# Patient Record
Sex: FEMALE | Race: WHITE | NOT HISPANIC OR LATINO
[De-identification: names, ages, dates, MRNs, and addresses within clinical notes are randomized per-mention and may not be internally consistent; named-entity substitution may affect disease eponyms.]

---

## 2018-03-19 PROBLEM — Z00.00 ENCOUNTER FOR PREVENTIVE HEALTH EXAMINATION: Status: ACTIVE | Noted: 2018-03-19

## 2018-04-16 ENCOUNTER — APPOINTMENT (OUTPATIENT)
Dept: NEUROLOGY | Facility: CLINIC | Age: 78
End: 2018-04-16
Payer: MEDICARE

## 2018-04-16 VITALS
HEIGHT: 61 IN | WEIGHT: 102 LBS | BODY MASS INDEX: 19.26 KG/M2 | DIASTOLIC BLOOD PRESSURE: 82 MMHG | SYSTOLIC BLOOD PRESSURE: 140 MMHG

## 2018-04-16 DIAGNOSIS — Z86.39 PERSONAL HISTORY OF OTHER ENDOCRINE, NUTRITIONAL AND METABOLIC DISEASE: ICD-10-CM

## 2018-04-16 DIAGNOSIS — Z78.9 OTHER SPECIFIED HEALTH STATUS: ICD-10-CM

## 2018-04-16 PROCEDURE — 99213 OFFICE O/P EST LOW 20 MIN: CPT

## 2018-04-16 RX ORDER — ATORVASTATIN CALCIUM 80 MG/1
TABLET, FILM COATED ORAL
Refills: 0 | Status: ACTIVE | COMMUNITY

## 2018-10-16 ENCOUNTER — APPOINTMENT (OUTPATIENT)
Dept: NEUROLOGY | Facility: CLINIC | Age: 78
End: 2018-10-16
Payer: MEDICARE

## 2018-10-16 VITALS
HEIGHT: 61 IN | WEIGHT: 102 LBS | BODY MASS INDEX: 19.26 KG/M2 | SYSTOLIC BLOOD PRESSURE: 116 MMHG | DIASTOLIC BLOOD PRESSURE: 82 MMHG

## 2018-10-16 PROCEDURE — 99213 OFFICE O/P EST LOW 20 MIN: CPT

## 2019-10-15 ENCOUNTER — APPOINTMENT (OUTPATIENT)
Dept: NEUROLOGY | Facility: CLINIC | Age: 79
End: 2019-10-15
Payer: MEDICARE

## 2019-10-15 VITALS
WEIGHT: 102 LBS | BODY MASS INDEX: 19.26 KG/M2 | DIASTOLIC BLOOD PRESSURE: 78 MMHG | HEIGHT: 61 IN | SYSTOLIC BLOOD PRESSURE: 150 MMHG

## 2019-10-15 PROCEDURE — 99213 OFFICE O/P EST LOW 20 MIN: CPT

## 2019-10-15 NOTE — ASSESSMENT
[FreeTextEntry1] : This is an 79-year-old woman who follows up today for history of intracerebral hemorrhage. She is doing well and having some difficulty walking more likely due to arthritis in her legs than due to sequelae of her hemorrhage. At this point I recommended control blood pressure. I offered physical therapy if her walking gets worse. I will see her back in the office in one year, sooner should the need arise.

## 2019-10-15 NOTE — PHYSICAL EXAM
[Person] : oriented to person [Place] : oriented to place [Time] : oriented to time [Remote Intact] : remote memory intact [Registration Intact] : recent registration memory intact [Span Intact] : the attention span was normal [Concentration Intact] : normal concentrating ability [Visual Intact] : visual attention was ~T not ~L decreased [Naming Objects] : no difficulty naming common objects [Repeating Phrases] : no difficulty repeating a phrase [Comprehension] : comprehension intact [Fluency] : fluency intact [Current Events] : adequate knowledge of current events [Past History] : adequate knowledge of personal past history [Cranial Nerves Optic (II)] : visual acuity intact bilaterally,  visual fields full to confrontation, pupils equal round and reactive to light [Cranial Nerves Facial (VII)] : face symmetrical [Cranial Nerves Oculomotor (III)] : extraocular motion intact [Cranial Nerves Trigeminal (V)] : facial sensation intact symmetrically [Cranial Nerves Glossopharyngeal (IX)] : tongue and palate midline [Cranial Nerves Vestibulocochlear (VIII)] : hearing was intact bilaterally [Cranial Nerves Accessory (XI - Cranial And Spinal)] : head turning and shoulder shrug symmetric [Cranial Nerves Hypoglossal (XII)] : there was no tongue deviation with protrusion [Motor Strength] : muscle strength was normal in all four extremities [Motor Tone] : muscle tone was normal in all four extremities [Involuntary Movements] : no involuntary movements were seen [No Muscle Atrophy] : normal bulk in all four extremities [Paresis Pronator Drift Right-Sided] : no pronator drift on the right [Motor Strength Lower Extremities Left] : strength was normal in the left lower extremity [Paresis Pronator Drift Left-Sided] : no pronator drift on the left [Sensation Tactile Decrease] : light touch was intact [Sensation Pain / Temperature Decrease] : pain and temperature was intact [Sensation Vibration Decrease] : vibration was intact [Proprioception] : proprioception was intact [Abnormal Walk] : normal gait [Balance] : balance was intact [Tremor] : no tremor present [Coordination - Dysmetria Impaired Finger-to-Nose Bilateral] : not present [2+] : Patella left 2+ [Sclera] : the sclera and conjunctiva were normal [PERRL With Normal Accommodation] : pupils were equal in size, round, reactive to light, with normal accommodation [Extraocular Movements] : extraocular movements were intact [No APD] : no afferent pupillary defect [No ZHANNA] : no internuclear ophthalmoplegia [Full Visual Field] : full visual field

## 2019-10-15 NOTE — CONSULT LETTER
[Dear  ___] : Dear  [unfilled], [Courtesy Letter:] : I had the pleasure of seeing your patient, [unfilled], in my office today. [Please see my note below.] : Please see my note below. [Sincerely,] : Sincerely, [Consult Closing:] : Thank you very much for allowing me to participate in the care of this patient.  If you have any questions, please do not hesitate to contact me. [FreeTextEntry3] : Kiran Khan M.D., Ph.D. DPN-N\par [facility]\par Neurology at [place]\par Medical Director of Stroke Services\par [facility]\par

## 2019-10-15 NOTE — HISTORY OF PRESENT ILLNESS
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 She is currently doing very well with physical therapy and now uses a walker to walk. She is regained a great amount of strength in her left leg. She has not had repeat imaging to her knowledge. She has not followed up with her surgeon. She is here today for neurologic followup treatment.\par \par Followup October 16, 2018:\par This is a 78-year-old woman who presents today for followup of intrathecal hematoma. She's overall doing well. She has some mild unsteadiness of gait at times which is not consistent. She feels she improved with physical therapy following her hospitalization. After her last visit I had ordered a head CT showed resolution of the hemorrhage. She is here today for neurologic followup.\par \par Followup October 15, 2019:\par This is a 79-year-old woman who presents today for followup of intracranial hemorrhage. She had her hemorrhage at the beginning of 2018. Left with left-sided weakness. She's recovered greatly from this. She has some mild left-sided symptoms occasionally. Otherwise doing well. She's having some problems walking secondary to arthritis in her legs and feet. She is here today for neurologic followup.

## 2019-10-15 NOTE — REVIEW OF SYSTEMS
[Difficulty Walking] : difficulty walking [As Noted in HPI] : as noted in HPI [Negative] : Heme/Lymph

## 2020-10-16 ENCOUNTER — APPOINTMENT (OUTPATIENT)
Dept: NEUROLOGY | Facility: CLINIC | Age: 80
End: 2020-10-16
Payer: MEDICARE

## 2020-10-16 VITALS
WEIGHT: 90 LBS | DIASTOLIC BLOOD PRESSURE: 70 MMHG | SYSTOLIC BLOOD PRESSURE: 140 MMHG | BODY MASS INDEX: 18.14 KG/M2 | TEMPERATURE: 98.1 F | HEIGHT: 59 IN

## 2020-10-16 DIAGNOSIS — I10 ESSENTIAL (PRIMARY) HYPERTENSION: ICD-10-CM

## 2020-10-16 PROCEDURE — 99213 OFFICE O/P EST LOW 20 MIN: CPT

## 2020-10-16 RX ORDER — LOSARTAN POTASSIUM AND HYDROCHLOROTHIAZIDE 12.5; 5 MG/1; MG/1
50-12.5 TABLET ORAL
Refills: 0 | Status: ACTIVE | COMMUNITY

## 2020-10-16 RX ORDER — PROPRANOLOL HYDROCHLORIDE 60 MG/1
60 TABLET ORAL
Refills: 0 | Status: ACTIVE | COMMUNITY

## 2020-10-16 NOTE — ASSESSMENT
[FreeTextEntry1] : This is an 80-year-old woman with a history of intracranial hemorrhage and was 3 years ago. She's recovered completely. Currently she should manage her blood pressure and continue antihypertensives as needed to maintain normal blood pressure. She should maintain a low LDL of under 70 and continue atorvastatin to achieve this goal. I will see her back in the office in one year for routine annual followup, sooner should the need arise.

## 2020-10-16 NOTE — HISTORY OF PRESENT ILLNESS
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 She is currently doing very well with physical therapy and now uses a walker to walk. She is regained a great amount of strength in her left leg. She has not had repeat imaging to her knowledge. She has not followed up with her surgeon. She is here today for neurologic followup treatment.\par \par Followup October 16, 2018:\par This is a 78-year-old woman who presents today for followup of intrathecal hematoma. She's overall doing well. She has some mild unsteadiness of gait at times which is not consistent. She feels she improved with physical therapy following her hospitalization. After her last visit I had ordered a head CT showed resolution of the hemorrhage. She is here today for neurologic followup.\par \par Followup October 15, 2019:\par This is a 79-year-old woman who presents today for followup of intracranial hemorrhage. She had her hemorrhage at the beginning of 2018. Left with left-sided weakness. She's recovered greatly from this. She has some mild left-sided symptoms occasionally. Otherwise doing well. She's having some problems walking secondary to arthritis in her legs and feet. She is here today for neurologic followup.\par \par Followup October 16, 2020:\par This is an 80-year-old woman who presents today for followup of intracranial hemorrhage. She had her hemorrhage in the beginning of 2018. It initially presented with left-sided weakness from which has recovered. She is currently doing well. She is on Hyzaar and propranolol for her blood pressure control. She is not having any signs of the current show. She is here today for routine neurologic followup.

## 2020-10-16 NOTE — CONSULT LETTER
[Dear  ___] : Dear  [unfilled], [Courtesy Letter:] : I had the pleasure of seeing your patient, [unfilled], in my office today. [Please see my note below.] : Please see my note below. [Sincerely,] : Sincerely, [Consult Closing:] : Thank you very much for allowing me to participate in the care of this patient.  If you have any questions, please do not hesitate to contact me. [FreeTextEntry3] : Kiran Khan M.D., Ph.D. DPN-N\par [facility]\par Neurology at [place]\par Medical Director of Stroke Services\par [facility]\par

## 2020-10-16 NOTE — PHYSICAL EXAM
[Person] : oriented to person [Place] : oriented to place [Remote Intact] : remote memory intact [Time] : oriented to time [Concentration Intact] : normal concentrating ability [Span Intact] : the attention span was normal [Registration Intact] : recent registration memory intact [Visual Intact] : visual attention was ~T not ~L decreased [Naming Objects] : no difficulty naming common objects [Fluency] : fluency intact [Repeating Phrases] : no difficulty repeating a phrase [Comprehension] : comprehension intact [Current Events] : adequate knowledge of current events [Cranial Nerves Oculomotor (III)] : extraocular motion intact [Past History] : adequate knowledge of personal past history [Cranial Nerves Optic (II)] : visual acuity intact bilaterally,  visual fields full to confrontation, pupils equal round and reactive to light [Cranial Nerves Trigeminal (V)] : facial sensation intact symmetrically [Cranial Nerves Facial (VII)] : face symmetrical [Cranial Nerves Vestibulocochlear (VIII)] : hearing was intact bilaterally [Cranial Nerves Accessory (XI - Cranial And Spinal)] : head turning and shoulder shrug symmetric [Cranial Nerves Glossopharyngeal (IX)] : tongue and palate midline [Motor Tone] : muscle tone was normal in all four extremities [Cranial Nerves Hypoglossal (XII)] : there was no tongue deviation with protrusion [Motor Strength] : muscle strength was normal in all four extremities [Involuntary Movements] : no involuntary movements were seen [No Muscle Atrophy] : normal bulk in all four extremities [Paresis Pronator Drift Right-Sided] : no pronator drift on the right [Paresis Pronator Drift Left-Sided] : no pronator drift on the left [Motor Strength Lower Extremities Bilaterally] : strength was normal in both lower extremities [Motor Strength Upper Extremities Bilaterally] : strength was normal in both upper extremities [Motor Strength Lower Extremities Left] : strength was normal in the left lower extremity [Sensation Tactile Decrease] : light touch was intact [Sensation Pain / Temperature Decrease] : pain and temperature was intact [Proprioception] : proprioception was intact [Sensation Vibration Decrease] : vibration was intact [Balance] : balance was intact [Abnormal Walk] : normal gait [Tremor] : no tremor present [Coordination - Dysmetria Impaired Finger-to-Nose Bilateral] : not present [2+] : Patella right 2+ [Sclera] : the sclera and conjunctiva were normal [No APD] : no afferent pupillary defect [PERRL With Normal Accommodation] : pupils were equal in size, round, reactive to light, with normal accommodation [Extraocular Movements] : extraocular movements were intact [Full Visual Field] : full visual field [No ZHANNA] : no internuclear ophthalmoplegia

## 2021-10-18 ENCOUNTER — APPOINTMENT (OUTPATIENT)
Dept: NEUROLOGY | Facility: CLINIC | Age: 81
End: 2021-10-18
Payer: MEDICARE

## 2021-10-18 PROCEDURE — 99213 OFFICE O/P EST LOW 20 MIN: CPT

## 2021-10-18 NOTE — PHYSICAL EXAM

## 2021-10-18 NOTE — HISTORY OF PRESENT ILLNESS
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 She is currently doing very well with physical therapy and now uses a walker to walk. She is regained a great amount of strength in her left leg. She has not had repeat imaging to her knowledge. She has not followed up with her surgeon. She is here today for neurologic followup treatment.\par \par Followup October 16, 2018:\par This is a 78-year-old woman who presents today for followup of intrathecal hematoma. She's overall doing well. She has some mild unsteadiness of gait at times which is not consistent. She feels she improved with physical therapy following her hospitalization. After her last visit I had ordered a head CT showed resolution of the hemorrhage. She is here today for neurologic followup.\par \par Followup October 15, 2019:\par This is a 79-year-old woman who presents today for followup of intracranial hemorrhage. She had her hemorrhage at the beginning of 2018. Left with left-sided weakness. She's recovered greatly from this. She has some mild left-sided symptoms occasionally. Otherwise doing well. She's having some problems walking secondary to arthritis in her legs and feet. She is here today for neurologic followup.\par \par Followup October 16, 2020:\par This is an 80-year-old woman who presents today for followup of intracranial hemorrhage. She had her hemorrhage in the beginning of 2018. It initially presented with left-sided weakness from which has recovered. She is currently doing well. She is on Hyzaar and propranolol for her blood pressure control. She is not having any signs of the current show. She is here today for routine neurologic followup.\par \par Followup October 18, 2021:\par This is an 81-year-old woman who presents today for followup of intracranial hemorrhage. At the beginning of 2018 she had a right frontal hemorrhage with left-sided weakness. She is recovered from this. She reports control of her blood pressure. She has no new neurologic symptoms or complaints.

## 2021-10-18 NOTE — CONSULT LETTER
[Dear  ___] : Dear  [unfilled], [Courtesy Letter:] : I had the pleasure of seeing your patient, [unfilled], in my office today. [Please see my note below.] : Please see my note below. [Consult Closing:] : Thank you very much for allowing me to participate in the care of this patient.  If you have any questions, please do not hesitate to contact me. [Sincerely,] : Sincerely, [FreeTextEntry3] : Kiran Khan M.D., Ph.D. DPN-N\par [facility]\par Neurology at [place]\par Medical Director of Stroke Services\par [facility]\par

## 2021-10-18 NOTE — ASSESSMENT
[FreeTextEntry1] : This is an 81-year-old woman with a history of intracerebral hematoma about 3 and half years ago. She's doing well. She should continue to control her blood pressure. I will see her back in one year for routine neurologic followup, sooner should the need arise.

## 2022-07-27 ENCOUNTER — APPOINTMENT (OUTPATIENT)
Dept: NEUROLOGY | Facility: CLINIC | Age: 82
End: 2022-07-27

## 2022-07-27 VITALS
BODY MASS INDEX: 20.16 KG/M2 | HEIGHT: 59 IN | WEIGHT: 100 LBS | SYSTOLIC BLOOD PRESSURE: 102 MMHG | DIASTOLIC BLOOD PRESSURE: 64 MMHG

## 2022-07-27 DIAGNOSIS — I61.9 NONTRAUMATIC INTRACEREBRAL HEMORRHAGE, UNSPECIFIED: ICD-10-CM

## 2022-07-27 PROCEDURE — 99214 OFFICE O/P EST MOD 30 MIN: CPT

## 2022-07-27 NOTE — REVIEW OF SYSTEMS
[As Noted in HPI] : as noted in HPI [Difficulty with Language] : ~M difficulty with language [Frequent Falls] : frequent falls [Negative] : Heme/Lymph

## 2022-07-27 NOTE — HISTORY OF PRESENT ILLNESS
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 She is currently doing very well with physical therapy and now uses a walker to walk. She is regained a great amount of strength in her left leg. She has not had repeat imaging to her knowledge. She has not followed up with her surgeon. She is here today for neurologic followup treatment.\par \par Followup October 16, 2018:\par This is a 78-year-old woman who presents today for followup of intrathecal hematoma. She's overall doing well. She has some mild unsteadiness of gait at times which is not consistent. She feels she improved with physical therapy following her hospitalization. After her last visit I had ordered a head CT showed resolution of the hemorrhage. She is here today for neurologic followup.\par \par Followup October 15, 2019:\par This is a 79-year-old woman who presents today for followup of intracranial hemorrhage. She had her hemorrhage at the beginning of 2018. Left with left-sided weakness. She's recovered greatly from this. She has some mild left-sided symptoms occasionally. Otherwise doing well. She's having some problems walking secondary to arthritis in her legs and feet. She is here today for neurologic followup.\par \par Followup October 16, 2020:\par This is an 80-year-old woman who presents today for followup of intracranial hemorrhage. She had her hemorrhage in the beginning of 2018. It initially presented with left-sided weakness from which has recovered. She is currently doing well. She is on Hyzaar and propranolol for her blood pressure control. She is not having any signs of the current show. She is here today for routine neurologic followup.\par \par Followup October 18, 2021:\par This is an 81-year-old woman who presents today for followup of intracranial hemorrhage. At the beginning of 2018 she had a right frontal hemorrhage with left-sided weakness. She is recovered from this. She reports control of her blood pressure. She has no new neurologic symptoms or complaints.\par \par Follow-up July 27, 2022:\par This is an 82-year-old woman who presents today for neurologic follow-up.  She has had a history of a right frontal hemorrhage with left-sided weakness.  She had recovered from that.  More recently about a week or 2 ago she fell injuring her left side.  It was not witnessed.  She lost consciousness.  She suffered a concussion as well as intracerebral hemorrhage per her .  She went initially to [facility] was discharged was not doing well and went back to [facility].  She has not she had started physical therapy following her hospitalization, but her  is setting it up.  She is here today for follow-up.

## 2022-07-27 NOTE — ASSESSMENT
[FreeTextEntry1] : This is an 82-year-old woman with history of intracerebral hematoma who fell recently and apparently had another intracerebral hemorrhage.  I like to obtain films and records to review.  At this point she should hold all antithrombotic agents until we have a chance to clear her.  I will see her back in 6 months but call her once I have a chance to review the images with any further plans that might be needed.

## 2022-07-27 NOTE — REASON FOR VISIT
[Follow-Up: _____] : a [unfilled] follow-up visit [Other: _____] : [unfilled] [FreeTextEntry1] : Recent fall and hospitalization

## 2022-07-27 NOTE — CONSULT LETTER
[Dear  ___] : Dear  [unfilled], [Courtesy Letter:] : I had the pleasure of seeing your patient, [unfilled], in my office today. [Please see my note below.] : Please see my note below. [Consult Closing:] : Thank you very much for allowing me to participate in the care of this patient.  If you have any questions, please do not hesitate to contact me. [Sincerely,] : Sincerely, [FreeTextEntry3] : Kiran Khan M.D., Ph.D. DPN-N\par [facility]\par Neurology at [place]\par Medical Director of Stroke Services\par [facility]\par

## 2022-07-27 NOTE — PHYSICAL EXAM
[Person] : oriented to person [Place] : oriented to place [Time] : oriented to time [Remote Intact] : remote memory intact [Registration Intact] : recent registration memory intact [Span Intact] : the attention span was normal [Concentration Intact] : normal concentrating ability [Visual Intact] : visual attention was ~T not ~L decreased [Naming Objects] : no difficulty naming common objects [Repeating Phrases] : no difficulty repeating a phrase [Fluency] : fluency not intact [Comprehension] : comprehension not intact [Current Events] : adequate knowledge of current events [Past History] : adequate knowledge of personal past history [Cranial Nerves Optic (II)] : visual acuity intact bilaterally,  visual fields full to confrontation, pupils equal round and reactive to light [Cranial Nerves Oculomotor (III)] : extraocular motion intact [Cranial Nerves Trigeminal (V)] : facial sensation intact symmetrically [Cranial Nerves Facial (VII)] : face symmetrical [Cranial Nerves Vestibulocochlear (VIII)] : hearing was intact bilaterally [Cranial Nerves Glossopharyngeal (IX)] : tongue and palate midline [Cranial Nerves Accessory (XI - Cranial And Spinal)] : head turning and shoulder shrug symmetric [Cranial Nerves Hypoglossal (XII)] : there was no tongue deviation with protrusion [Motor Tone] : muscle tone was normal in all four extremities [Motor Strength] : muscle strength was normal in all four extremities [Involuntary Movements] : no involuntary movements were seen [No Muscle Atrophy] : normal bulk in all four extremities [Paresis Pronator Drift Right-Sided] : no pronator drift on the right [Paresis Pronator Drift Left-Sided] : no pronator drift on the left [Motor Strength Upper Extremities Bilaterally] : strength was normal in both upper extremities [Motor Strength Lower Extremities Bilaterally] : strength was normal in both lower extremities [Sensation Tactile Decrease] : light touch was intact [Sensation Pain / Temperature Decrease] : pain and temperature was intact [Sensation Vibration Decrease] : vibration was intact [Proprioception] : proprioception was intact [Tremor] : no tremor present [Coordination - Dysmetria Impaired Finger-to-Nose Bilateral] : not present [2+] : Patella left 2+ [FreeTextEntry4] : Has mild disfluency, some problem with comprehension intermittently [FreeTextEntry8] : Using a walker for balance [Sclera] : the sclera and conjunctiva were normal [PERRL With Normal Accommodation] : pupils were equal in size, round, reactive to light, with normal accommodation [Extraocular Movements] : extraocular movements were intact [No APD] : no afferent pupillary defect [No ZHANNA] : no internuclear ophthalmoplegia [Full Visual Field] : full visual field

## 2022-10-10 ENCOUNTER — APPOINTMENT (OUTPATIENT)
Dept: NEUROLOGY | Facility: CLINIC | Age: 82
End: 2022-10-10

## 2023-01-24 ENCOUNTER — APPOINTMENT (OUTPATIENT)
Dept: NEUROLOGY | Facility: CLINIC | Age: 83
End: 2023-01-24